# Patient Record
Sex: MALE | Race: WHITE | NOT HISPANIC OR LATINO | Employment: STUDENT | URBAN - METROPOLITAN AREA
[De-identification: names, ages, dates, MRNs, and addresses within clinical notes are randomized per-mention and may not be internally consistent; named-entity substitution may affect disease eponyms.]

---
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
## 2021-12-13 ENCOUNTER — HOSPITAL ENCOUNTER (EMERGENCY)
Facility: HOSPITAL | Age: 21
Discharge: ANOTHER HOSPITAL | End: 2021-12-13
Attending: EMERGENCY MEDICINE | Admitting: EMERGENCY MEDICINE

## 2021-12-13 ENCOUNTER — APPOINTMENT (OUTPATIENT)
Dept: ULTRASOUND IMAGING | Facility: HOSPITAL | Age: 21
End: 2021-12-13

## 2021-12-13 VITALS
HEIGHT: 76 IN | RESPIRATION RATE: 16 BRPM | OXYGEN SATURATION: 100 % | DIASTOLIC BLOOD PRESSURE: 77 MMHG | TEMPERATURE: 98.1 F | HEART RATE: 92 BPM | WEIGHT: 180 LBS | SYSTOLIC BLOOD PRESSURE: 119 MMHG | BODY MASS INDEX: 21.92 KG/M2

## 2021-12-13 DIAGNOSIS — N50.89 SWELLING OF LEFT TESTICLE: Primary | ICD-10-CM

## 2021-12-13 DIAGNOSIS — N50.812 PAIN IN LEFT TESTICLE: ICD-10-CM

## 2021-12-13 LAB
BACTERIA UR QL AUTO: ABNORMAL /HPF
BILIRUB UR QL STRIP: NEGATIVE
CLARITY UR: CLEAR
COLOR UR: YELLOW
GLUCOSE UR STRIP-MCNC: NEGATIVE MG/DL
HGB UR QL STRIP.AUTO: NEGATIVE
HYALINE CASTS UR QL AUTO: ABNORMAL /LPF
KETONES UR QL STRIP: NEGATIVE
LEUKOCYTE ESTERASE UR QL STRIP.AUTO: ABNORMAL
NITRITE UR QL STRIP: NEGATIVE
PH UR STRIP.AUTO: 6 [PH] (ref 4.5–8)
PROT UR QL STRIP: NEGATIVE
RBC # UR STRIP: ABNORMAL /HPF
REF LAB TEST METHOD: ABNORMAL
SP GR UR STRIP: 1.02 (ref 1–1.03)
SQUAMOUS #/AREA URNS HPF: ABNORMAL /HPF
UROBILINOGEN UR QL STRIP: ABNORMAL
WBC # UR STRIP: ABNORMAL /HPF

## 2021-12-13 PROCEDURE — 96372 THER/PROPH/DIAG INJ SC/IM: CPT

## 2021-12-13 PROCEDURE — 81001 URINALYSIS AUTO W/SCOPE: CPT | Performed by: EMERGENCY MEDICINE

## 2021-12-13 PROCEDURE — 99283 EMERGENCY DEPT VISIT LOW MDM: CPT

## 2021-12-13 PROCEDURE — 99285 EMERGENCY DEPT VISIT HI MDM: CPT | Performed by: EMERGENCY MEDICINE

## 2021-12-13 PROCEDURE — 76870 US EXAM SCROTUM: CPT

## 2021-12-13 PROCEDURE — 93976 VASCULAR STUDY: CPT

## 2021-12-14 ENCOUNTER — HOSPITAL ENCOUNTER (EMERGENCY)
Facility: HOSPITAL | Age: 21
Discharge: HOME | End: 2021-12-14
Attending: EMERGENCY MEDICINE | Admitting: EMERGENCY MEDICINE

## 2021-12-14 VITALS
TEMPERATURE: 98.4 F | OXYGEN SATURATION: 98 % | WEIGHT: 200 LBS | RESPIRATION RATE: 16 BRPM | BODY MASS INDEX: 24.36 KG/M2 | HEART RATE: 99 BPM | HEIGHT: 76 IN | SYSTOLIC BLOOD PRESSURE: 135 MMHG | DIASTOLIC BLOOD PRESSURE: 89 MMHG

## 2021-12-14 DIAGNOSIS — N45.1 LEFT EPIDIDYMITIS: Primary | ICD-10-CM

## 2021-12-14 PROCEDURE — 25010000002 CEFTRIAXONE PER 250 MG: Performed by: EMERGENCY MEDICINE

## 2021-12-14 PROCEDURE — 96372 THER/PROPH/DIAG INJ SC/IM: CPT

## 2021-12-14 RX ORDER — DOXYCYCLINE 100 MG/1
100 CAPSULE ORAL ONCE
Status: COMPLETED | OUTPATIENT
Start: 2021-12-14 | End: 2021-12-14

## 2021-12-14 RX ORDER — IBUPROFEN 600 MG/1
600 TABLET ORAL EVERY 6 HOURS PRN
Qty: 30 TABLET | Refills: 0 | Status: SHIPPED | OUTPATIENT
Start: 2021-12-14

## 2021-12-14 RX ORDER — ACETAMINOPHEN 500 MG
1000 TABLET ORAL EVERY 8 HOURS PRN
Qty: 30 TABLET | Refills: 0 | Status: SHIPPED | OUTPATIENT
Start: 2021-12-14

## 2021-12-14 RX ORDER — DOXYCYCLINE HYCLATE 100 MG/1
100 TABLET, DELAYED RELEASE ORAL 2 TIMES DAILY
Qty: 20 TABLET | Refills: 0 | Status: SHIPPED | OUTPATIENT
Start: 2021-12-14 | End: 2021-12-24

## 2021-12-14 RX ADMIN — LIDOCAINE HYDROCHLORIDE 490 MG: 10 INJECTION, SOLUTION EPIDURAL; INFILTRATION; INTRACAUDAL; PERINEURAL at 00:55

## 2021-12-14 RX ADMIN — DOXYCYCLINE 100 MG: 100 CAPSULE ORAL at 01:03

## 2021-12-14 NOTE — DISCHARGE INSTRUCTIONS
Go directly to [facility] ED.  Inform them of visit to [facility] ED, concerns about possible testicular torsion and need for testicular ultrasound.  Nothing to eat or drink into you have been seen at [facility] ED. return to ED for medical emergencies.

## 2021-12-14 NOTE — ED PROVIDER NOTES
John Tillman is a 21-year-old white male who present secondary to left-sided groin pain.  Onset this past Thursday or Friday.  Patient works at [employer].  He regularly lifts heavy objects.  He thinks he might have strained while lifting a heavy object.  However he does not recall a specific incident that caused his pain.  The pain is located in the left testicle.  Associated pain and swelling.  Patient elected to come to the ED tonight for evaluation.    History of inguinal hernia.  No other  issues.  Patient is not sexually active.  No history of STDs.      History provided by:  Patient      Review of Systems   Constitutional: Negative for fever.   HENT: Negative for rhinorrhea.    Eyes: Negative for redness.   Respiratory: Negative for cough.    Cardiovascular: Negative for chest pain.   Gastrointestinal: Negative for abdominal pain.   Genitourinary: Positive for scrotal swelling (Left testicle) and testicular pain. Negative for dysuria.   Musculoskeletal: Negative for back pain.   Skin: Negative for rash.   Neurological: Negative for syncope.   All other systems reviewed and are negative.      Past Medical History:   Diagnosis Date   • Hernia, inguinal    • History of ear infections    • Right leg injury     hair line fracture   • Strep throat        No Known Allergies    Past Surgical History:   Procedure Laterality Date   • ADENOIDECTOMY     • HERNIA REPAIR     • TYMPANOSTOMY TUBE PLACEMENT         Family History   Problem Relation Age of Onset   • No Known Problems Father    • No Known Problems Mother        Social History     Socioeconomic History   • Marital status: Single   Tobacco Use   • Smoking status: Never Smoker   • Smokeless tobacco: Never Used   Substance and Sexual Activity   • Alcohol use: No   • Drug use: No   • Sexual activity: Defer           Objective   Physical Exam  Vitals and nursing note reviewed.   Constitutional:       General: He is not in acute distress.     Appearance:  Normal appearance. He is normal weight. He is not ill-appearing, toxic-appearing or diaphoretic.      Comments: 21-year-old white male laying in bed.  Patient appears in good overall health.  Vital signs unremarkable.  Patient friendly and cooperative.   HENT:      Head: Normocephalic and atraumatic.   Pulmonary:      Effort: Pulmonary effort is normal.   Abdominal:      General: Abdomen is flat. Bowel sounds are normal.      Palpations: Abdomen is soft.      Hernia: There is no hernia in the left inguinal area or right inguinal area.   Genitourinary:     Penis: Normal.       Testes:         Left: Tenderness and swelling present.      Epididymis:      Right: Normal.      Left: Tenderness present.       Musculoskeletal:      Cervical back: Neck supple.   Neurological:      Mental Status: He is alert.         Procedures           ED Course  ED Course as of 12/13/21 2200   Mon Dec 13, 2021   1956 Patient has noonan tender in the left testicle. Obtaining UA. Unfortunately ultrasound is not available to me tonight. Will have to transfer to another facility. [SS]   2133 UA shows trace leukocytes and 6-12 WBCs.  Otherwise unremarkable.  Not convincing evidence of UTI.   Based on exam I suspect orchitis however patient needs testicular ultrasound to rule out torsion.  We do not have ultrasound available at this hospital tonight.  I discussed with Dr. Cuellar at [facility] ED.  He is accepted patient in transfer.  Discussed with patient possibility of testicular torsion, need for ultrasound, possible need for emergency surgery to prevent loss of testicle.  Patient knowledges understanding.  Will transfer to [facility]. [SS]      ED Course User Index  [SS] Josef Nobles MD      Labs Reviewed   URINALYSIS W/ MICROSCOPIC IF INDICATED (NO CULTURE) - Abnormal; Notable for the following components:       Result Value    Leuk Esterase, UA Trace (*)     All other components within normal limits   URINALYSIS, MICROSCOPIC  ONLY - Abnormal; Notable for the following components:    WBC, UA 6-12 (*)     All other components within normal limits          My differential for testicle pain includes but is not limited to testicular torsion, orchitis, epididymitis, hydrocele, spermatocele, testicular cancer, testicular contusion, STD, UTI, inguinal hernia extending into the scrotum and testicular rupture.                                        MDM    Final diagnoses:   Swelling of left testicle   Pain in left testicle       ED Disposition  ED Disposition     ED Disposition Condition Comment    Transfer to Another Facility   [facility] ED for testicular ultrasound          No follow-up provider specified.       Medication List      No changes were made to your prescriptions during this visit.          Josef Nobles MD  12/13/21 8337

## 2021-12-14 NOTE — ED NOTES
Pt presents to ED via POV from [facility] ED w/cc r/o testicular torsion.  Pt sent to this ED for US to r/o left testicular torsion.  Pt ambulatory, states pain 6/10    Appropriate PPE worn at all times during pt care and interactions.     Cassandra Luo, RN  12/13/21 4228

## 2021-12-14 NOTE — ED PROVIDER NOTES
EMERGENCY DEPARTMENT ENCOUNTER  Patient was placed in face mask in first look and the following protective measures were taken unless  documented below in the ED course. Patient was wearing facemask when I entered the room and throughout our encounter. I wore full protective equipment throughout this patient encounter including a N95 mask, eye shield, gown and gloves. Hand hygiene was performed before donning protective equipment and after removal when leaving the room.    Room Number:  25/25  Date of encounter:  12/14/2021  PCP: Ze Wren MD    HPI:  Context: Lukas Tillman is a 21 y.o. male who presents to the ED c/o chief complaint of left testicle pain and swelling.  Patient reports that he first noticed it on Friday, has been somewhat progressive since that time.  Patient complains of dull achy pain in his testicle, worse with movement.  Patient denies any abrupt onset or change in his pain.  Patient denies any dysuria, no hematuria, no increased urinary frequency or urgency.  No penile discharge, no penile skin lesions, no skin lesions on scrotum.  Patient denies any abdominal pain, no nausea vomiting, no back pain, no fever shakes chills or night sweats.  Patient reports that he has had sex in the past but is not currently sexually active, denies any history of STDs.    MEDICAL HISTORY REVIEW  Reviewed in EPIC    PAST MEDICAL HISTORY  Active Ambulatory Problems     Diagnosis Date Noted   • Sports physical 07/10/2017   • Osteochondritis dissecans 04/25/2013     Resolved Ambulatory Problems     Diagnosis Date Noted   • No Resolved Ambulatory Problems     Past Medical History:   Diagnosis Date   • Hernia, inguinal    • History of ear infections    • Right leg injury    • Strep throat        PAST SURGICAL HISTORY  Past Surgical History:   Procedure Laterality Date   • ADENOIDECTOMY     • HERNIA REPAIR     • TYMPANOSTOMY TUBE PLACEMENT         FAMILY HISTORY  Family History   Problem Relation Age of  Onset   • No Known Problems Father    • No Known Problems Mother        SOCIAL HISTORY  Social History     Socioeconomic History   • Marital status: Single   Tobacco Use   • Smoking status: Never Smoker   • Smokeless tobacco: Never Used   Substance and Sexual Activity   • Alcohol use: No   • Drug use: No   • Sexual activity: Defer       ALLERGIES  Patient has no known allergies.    The patient's allergies have been reviewed    REVIEW OF SYSTEMS  All systems reviewed and negative except for those discussed in HPI.     PHYSICAL EXAM  I have reviewed the triage vital signs and nursing notes.  ED Triage Vitals   Temp Heart Rate Resp BP SpO2   12/14/21 0034 12/13/21 2234 12/13/21 2234 12/14/21 0033 12/13/21 2234   98.4 °F (36.9 °C) 98 16 139/94 98 %      Temp src Heart Rate Source Patient Position BP Location FiO2 (%)   12/14/21 0034 -- 12/14/21 0033 12/14/21 0033 --   Oral  Sitting Right arm        General: No acute distress.  HENT: NCAT, PERRL, Nares patent.  Eyes: no scleral icterus.  Neck: trachea midline, no ROM limitations.  CV: regular rhythm, regular rate.  Respiratory: normal effort, CTAB.  Abdomen: soft, nondistended, NTTP, no rebound tenderness, no guarding or rigidity.  : New exam performed, ED tech present throughout as chaperone.  Left testicle is swollen and tender, positive Prehn sign.  Right testicle is normal in appearance.  Positive cremasteric reflex bilaterally.  Penis normal in appearance, no skin lesions, no scrotal skin lesions.  No inguinal lymphadenopathy.  Musculoskeletal: no deformity.  Neuro: alert, moves all extremities, follows commands.  Skin: warm, dry.    LAB RESULTS  Recent Results (from the past 24 hour(s))   Urinalysis With Microscopic If Indicated (No Culture) - Urine, Clean Catch    Collection Time: 12/13/21  7:57 PM    Specimen: Urine, Clean Catch   Result Value Ref Range    Color, UA Yellow Yellow, Straw    Appearance, UA Clear Clear    pH, UA 6.0 4.5 - 8.0    Specific Gravity,  UA 1.025 1.003 - 1.030    Glucose, UA Negative Negative    Ketones, UA Negative Negative    Bilirubin, UA Negative Negative    Blood, UA Negative Negative    Protein, UA Negative Negative    Leuk Esterase, UA Trace (A) Negative    Nitrite, UA Negative Negative    Urobilinogen, UA 0.2 E.U./dL 0.2 - 1.0 E.U./dL   Urinalysis, Microscopic Only - Urine, Clean Catch    Collection Time: 12/13/21  7:57 PM    Specimen: Urine, Clean Catch   Result Value Ref Range    RBC, UA None Seen None Seen /HPF    WBC, UA 6-12 (A) None Seen /HPF    Bacteria, UA None Seen None Seen /HPF    Squamous Epithelial Cells, UA None Seen None Seen, 0-2 /HPF    Hyaline Casts, UA None Seen None Seen /LPF    Methodology Manual Light Microscopy        I ordered the above labs and reviewed the results.    RADIOLOGY  US Testicular or Ovarian Vascular Limited    Result Date: 12/13/2021  PORTAL DOPPLER  HISTORY: Radicular pain on the left.  COMPARISON: None available.  TECHNIQUE: Grayscale, color Doppler, spectral Doppler waveform analysis was performed through the scrotum.  FINDINGS: Right testicle measures 3.6 x 2.4 x 2.2 cm. Left testicle measures 4.1 x 2.9 x 2.4 cm. Normal color Doppler flow is seen within both testicles. The patient's left epididymis is enlarged and very hyperemic. It is heterogeneous in echotexture. Appearance is suggestive of epididymitis. There is also a small left hydrocele. Patient is noted to have a cyst within the epididymal head, measuring 8 x 5 x 4 mm. Right epididymis appears unremarkable.      Left epididymitis. No evidence of testicular torsion.  This report was finalized on 12/13/2021 11:54 PM by Dr. Valentina Granger M.D.        I ordered the above noted radiological studies. I reviewed the images and results. I agree with the radiologist interpretation.    PROCEDURES  Procedures    MEDICATIONS GIVEN IN ER  Medications   cefTRIAXone (ROCEPHIN) 350 mg/ml in lidocaine 1% IM syringe (500 mg vial) (has no administration in  time range)   doxycycline (MONODOX) capsule 100 mg (has no administration in time range)       PROGRESS, DATA ANALYSIS, CONSULTS, AND MEDICAL DECISION MAKING  A complete history and physical exam have been performed.  All available laboratory and imaging results have been reviewed by myself prior to disposition.    MDM  After the initial H&P, I discussed pertinent information from history and physical exam with patient/family.  Discussed differential diagnosis.  Discussed plan for ED evaluation/work-up/treatment.  All questions answered.  Patient/family is agreeable with plan.  ED Course as of 12/14/21 0042   Tue Dec 14, 2021   0042 Ultrasound is negative for torsion, patient has left epididymitis.  Consistent with exam.  Patient is under 35 years old, does not engage in anal insertive intercourse.  Giving 500 mg IM ceftriaxone, prescribing 10-day course of doxycycline.  Discussed need for close follow-up with primary care and urology, extensive discussion return precautions.  Patient comfortable with discharge, no questions or concerns. [JG]   0042 The patient was reexamined.  They have had symptomatic improvement during their ED stay.  I discussed today's findings with the patient, explaining the pertinent positives and negatives from today's visit, and the plan of care.  Discussed plan for discharge as there is no emergent indication for admission.  Discussed limitation of the ED work-up and that this is to rule out life-threatening emergencies but that they could require further testing as determined by their primary care and or any referred specialist patient is agreeable and understands need for follow-up and repeat exam/testing.  Patient is aware that discharge does not mean there is nothing wrong, indicates no emergency is present, and that they must continue their care with their primary care physician and/or any referred specialist.  They were given appropriate follow-up with their primary care physician  and/or specialist.  I had an extensive discussion on the expected clinical course and return precautions.  Patient understands to return to the emergency department for continuation, worsening, or new symptoms.  I answered any of the patient's questions. Patient was discharged home in a stable condition.     [JG]      ED Course User Index  [JG] Damon Cuellar MD       AS OF 00:42 EST VITALS:    BP - 139/94  HR - 98  TEMP - 98.4 °F (36.9 °C) (Oral)  O2 SATS - 98%    DIAGNOSIS  Final diagnoses:   Left epididymitis         DISPOSITION  DISCHARGE    Patient discharged in stable condition.    Reviewed implications of results, diagnosis, meds, responsibility to follow up, warning signs and symptoms of possible worsening, potential complications and reasons to return to ER.    Patient/Family voiced understanding of above instructions.    Discussed plan for discharge, as there is no emergent indication for admission. Patient referred to primary care provider for BP management due to today's BP. Pt/family is agreeable and understands need for follow up and repeat testing.  Pt is aware that discharge does not mean that nothing is wrong but it indicates no emergency is present that requires admission and they must continue care with follow-up as given below or physician of their choice.     FOLLOW-UP  Ze Wren MD  [facility] [address] [phone]    Schedule an appointment as soon as possible for a visit in 2 days  even if well    [facility]  [address] [facility] [id number] [phone]  Schedule an appointment as soon as possible for a visit in 2 days           Medication List      New Prescriptions    acetaminophen 500 MG tablet  Commonly known as: TYLENOL  Take 2 tablets by mouth Every 8 (Eight) Hours As Needed for Mild Pain .     doxycycline 100 MG enteric coated tablet  Commonly known as: DORYX  Take 1 tablet by mouth 2 (Two) Times a Day for 10 days.      ibuprofen 600 MG tablet  Commonly known as: ADVIL,MOTRIN  Take 1 tablet by mouth Every 6 (Six) Hours As Needed for Mild Pain .           Where to Get Your Medications      These medications were sent to [facility] [place] - [address] [facility] [address] - [phone]  - [phone]   [address] [address] [facility] [address]    Phone: [phone]   · acetaminophen 500 MG tablet  · doxycycline 100 MG enteric coated tablet  · ibuprofen 600 MG tablet          Damon Cuellar MD  12/14/21 0044